# Patient Record
Sex: FEMALE | URBAN - METROPOLITAN AREA
[De-identification: names, ages, dates, MRNs, and addresses within clinical notes are randomized per-mention and may not be internally consistent; named-entity substitution may affect disease eponyms.]

---

## 2018-10-17 ENCOUNTER — OFFICE VISIT (OUTPATIENT)
Dept: URBAN - METROPOLITAN AREA CLINIC 58 | Facility: CLINIC | Age: 73
End: 2018-10-17

## 2018-10-17 DIAGNOSIS — H43.811 VITREOUS DETACHMENT OF RIGHT EYE: ICD-10-CM

## 2018-10-17 PROCEDURE — 92004 COMPRE OPH EXAM NEW PT 1/>: CPT | Performed by: OPTOMETRIST

## 2018-10-17 ASSESSMENT — INTRAOCULAR PRESSURE
OD: 13
OS: 13

## 2018-10-17 ASSESSMENT — VISUAL ACUITY
OD: 20/20
OS: 20/20

## 2018-10-17 NOTE — IMPRESSION/PLAN
Impression: Age-related nuclear cataract, bilateral: H25.13. Plan: Discussed diagnosis in detail with patient. No treatment is required at this time. Will continue to observe condition and or symptoms. Call if [address] worsens.

## 2018-10-17 NOTE — IMPRESSION/PLAN
Impression: Vitreous detachment of right eye: H43.811. Plan: Posterior vitreous detachment accounts for the patient's complaints. There is no evidence of retinal pathology. All signs and risks of retinal detachment and tears were discussed in detail. Patient instructed to call the office immediately if any symptoms noted.

## 2018-10-17 NOTE — IMPRESSION/PLAN
Impression: Type 2 diabetes mellitus w/o complication: V47.2. Plan: Diabetes type II: no background retinopathy, no signs of neovascularization noted. Discussed ocular and systemic benefits of blood sugar control.

## 2020-06-18 ENCOUNTER — OFFICE VISIT (OUTPATIENT)
Dept: URBAN - METROPOLITAN AREA CLINIC 58 | Facility: CLINIC | Age: 75
End: 2020-06-18
Payer: COMMERCIAL

## 2020-06-18 PROCEDURE — 92014 COMPRE OPH EXAM EST PT 1/>: CPT | Performed by: OPTOMETRIST

## 2020-06-18 ASSESSMENT — VISUAL ACUITY
OD: 20/25
OS: 20/25

## 2020-06-18 ASSESSMENT — KERATOMETRY
OS: 44.13
OD: 44.50

## 2020-06-18 ASSESSMENT — INTRAOCULAR PRESSURE
OS: 12
OD: 12

## 2020-06-18 NOTE — IMPRESSION/PLAN
Impression: Type 2 diabetes mellitus w/o complication: G80.0. Plan: Diabetes type II: no background retinopathy, no signs of neovascularization noted. Discussed ocular and systemic benefits of blood sugar control.

## 2021-06-25 ENCOUNTER — OFFICE VISIT (OUTPATIENT)
Dept: URBAN - METROPOLITAN AREA CLINIC 58 | Facility: CLINIC | Age: 76
End: 2021-06-25
Payer: MEDICARE

## 2021-06-25 DIAGNOSIS — E11.9 TYPE 2 DIABETES MELLITUS W/O COMPLICATION: Primary | ICD-10-CM

## 2021-06-25 DIAGNOSIS — H52.4 PRESBYOPIA: ICD-10-CM

## 2021-06-25 DIAGNOSIS — H25.13 AGE-RELATED NUCLEAR CATARACT, BILATERAL: ICD-10-CM

## 2021-06-25 DIAGNOSIS — H40.013 OPEN ANGLE WITH BORDERLINE FINDINGS, LOW RISK, BILATERAL: ICD-10-CM

## 2021-06-25 PROCEDURE — 92133 CPTRZD OPH DX IMG PST SGM ON: CPT | Performed by: OPTOMETRIST

## 2021-06-25 PROCEDURE — 92014 COMPRE OPH EXAM EST PT 1/>: CPT | Performed by: OPTOMETRIST

## 2021-06-25 PROCEDURE — 92015 DETERMINE REFRACTIVE STATE: CPT | Performed by: OPTOMETRIST

## 2021-06-25 PROCEDURE — 76514 ECHO EXAM OF EYE THICKNESS: CPT | Performed by: OPTOMETRIST

## 2021-06-25 ASSESSMENT — INTRAOCULAR PRESSURE
OD: 14
OS: 14

## 2021-06-25 ASSESSMENT — VISUAL ACUITY
OS: 20/25
OD: 20/25

## 2021-06-25 NOTE — IMPRESSION/PLAN
Impression: Type 2 diabetes mellitus w/o complication: G74.6. Plan: Diabetes type II: no background retinopathy, no signs of neovascularization noted. Discussed ocular and systemic benefits of blood sugar control.

## 2021-06-25 NOTE — IMPRESSION/PLAN
Impression: Age-related nuclear cataract, bilateral: H25.13. Plan: Discussed diagnosis in detail with patient. Stable. No treatment is required at this time. Will continue to observe condition and or symptoms. Call if [facility] worsens. Rx not released. Patient will return for refraction 1-2 weeks.

## 2021-06-25 NOTE — IMPRESSION/PLAN
Impression: Open angle with borderline findings, low risk, bilateral: H40.013. Plan: Diagnosis discussed in detail. No treatment required at this time. Continue to observe pt's optic nerve and IOP. OCT ON done today shows WNL.

## 2021-07-02 ENCOUNTER — TESTING ONLY (OUTPATIENT)
Dept: URBAN - METROPOLITAN AREA CLINIC 58 | Facility: CLINIC | Age: 76
End: 2021-07-02
Payer: COMMERCIAL

## 2021-07-02 PROCEDURE — 92012 INTRM OPH EXAM EST PATIENT: CPT | Performed by: OPTOMETRIST

## 2021-07-02 ASSESSMENT — VISUAL ACUITY
OD: 20/20
OS: 20/20

## 2022-06-28 ENCOUNTER — OFFICE VISIT (OUTPATIENT)
Dept: URBAN - METROPOLITAN AREA CLINIC 58 | Facility: CLINIC | Age: 77
End: 2022-06-28
Payer: MEDICARE

## 2022-06-28 DIAGNOSIS — H25.13 AGE-RELATED NUCLEAR CATARACT, BILATERAL: ICD-10-CM

## 2022-06-28 DIAGNOSIS — E11.9 TYPE 2 DIABETES MELLITUS W/O COMPLICATION: Primary | ICD-10-CM

## 2022-06-28 PROCEDURE — 92014 COMPRE OPH EXAM EST PT 1/>: CPT | Performed by: OPTOMETRIST

## 2022-06-28 ASSESSMENT — INTRAOCULAR PRESSURE
OS: 12
OD: 13

## 2022-06-28 ASSESSMENT — VISUAL ACUITY
OD: 20/20
OS: 20/25

## 2022-06-28 NOTE — IMPRESSION/PLAN
Impression: Age-related nuclear cataract, bilateral: H25.13. Plan: Discussed diagnosis in detail with patient. Stable. No treatment is required at this time. Will continue to observe condition and or symptoms. Call if [place] worsens.

## 2022-06-28 NOTE — IMPRESSION/PLAN
Impression: Type 2 diabetes mellitus w/o complication: M74.3. Plan: Diabetes type II: no background retinopathy, no signs of neovascularization noted. Discussed ocular and systemic benefits of blood sugar control.

## 2023-10-18 ENCOUNTER — OFFICE VISIT (OUTPATIENT)
Dept: URBAN - METROPOLITAN AREA CLINIC 58 | Facility: CLINIC | Age: 78
End: 2023-10-18
Payer: COMMERCIAL

## 2023-10-18 DIAGNOSIS — E11.9 TYPE 2 DIABETES MELLITUS W/O COMPLICATION: ICD-10-CM

## 2023-10-18 DIAGNOSIS — H52.4 PRESBYOPIA: Primary | ICD-10-CM

## 2023-10-18 DIAGNOSIS — H25.13 AGE-RELATED NUCLEAR CATARACT, BILATERAL: ICD-10-CM

## 2023-10-18 PROCEDURE — 92014 COMPRE OPH EXAM EST PT 1/>: CPT | Performed by: OPTOMETRIST

## 2023-10-18 ASSESSMENT — VISUAL ACUITY
OS: 20/20
OD: 20/20

## 2023-10-18 ASSESSMENT — KERATOMETRY
OS: 44.00
OD: 44.50

## 2023-10-18 ASSESSMENT — INTRAOCULAR PRESSURE
OS: 14
OD: 13